# Patient Record
Sex: FEMALE | Race: BLACK OR AFRICAN AMERICAN | NOT HISPANIC OR LATINO | Employment: UNEMPLOYED | URBAN - METROPOLITAN AREA
[De-identification: names, ages, dates, MRNs, and addresses within clinical notes are randomized per-mention and may not be internally consistent; named-entity substitution may affect disease eponyms.]

---

## 2017-01-28 ENCOUNTER — COMMUNICATION - HEALTHEAST (OUTPATIENT)
Dept: SCHEDULING | Facility: CLINIC | Age: 27
End: 2017-01-28

## 2017-02-02 ENCOUNTER — HOSPITAL ENCOUNTER (OUTPATIENT)
Dept: PHYSICAL MEDICINE AND REHAB | Facility: CLINIC | Age: 27
Discharge: HOME | End: 2017-02-02
Attending: EMERGENCY MEDICINE

## 2017-02-02 DIAGNOSIS — M54.16 LUMBAR RADICULITIS: ICD-10-CM

## 2017-02-02 DIAGNOSIS — G47.9 SLEEP DISTURBANCE: ICD-10-CM

## 2017-02-02 DIAGNOSIS — M51.26 LUMBAR DISC HERNIATION: ICD-10-CM

## 2017-02-02 DIAGNOSIS — M79.18 MYOFASCIAL PAIN: ICD-10-CM

## 2017-02-02 ASSESSMENT — MIFFLIN-ST. JEOR: SCORE: 1503.8

## 2017-02-03 ENCOUNTER — COMMUNICATION - HEALTHEAST (OUTPATIENT)
Dept: PHYSICAL MEDICINE AND REHAB | Facility: CLINIC | Age: 27
End: 2017-02-03

## 2017-02-03 ENCOUNTER — HOSPITAL ENCOUNTER (OUTPATIENT)
Dept: MRI IMAGING | Facility: CLINIC | Age: 27
Discharge: HOME | End: 2017-02-03
Attending: PHYSICIAN ASSISTANT

## 2017-02-03 DIAGNOSIS — M51.26 LUMBAR DISC HERNIATION: ICD-10-CM

## 2017-02-03 DIAGNOSIS — M54.16 LUMBAR RADICULITIS: ICD-10-CM

## 2017-02-07 ENCOUNTER — COMMUNICATION - HEALTHEAST (OUTPATIENT)
Dept: PHYSICAL MEDICINE AND REHAB | Facility: CLINIC | Age: 27
End: 2017-02-07

## 2017-02-07 DIAGNOSIS — N83.209 CYST OF OVARY, UNSPECIFIED LATERALITY: ICD-10-CM

## 2017-03-06 ENCOUNTER — HOSPITAL ENCOUNTER (OUTPATIENT)
Dept: PHYSICAL MEDICINE AND REHAB | Facility: CLINIC | Age: 27
Discharge: HOME | End: 2017-03-06
Attending: PHYSICIAN ASSISTANT

## 2017-03-06 DIAGNOSIS — M54.2 CERVICALGIA: ICD-10-CM

## 2017-03-06 DIAGNOSIS — M54.16 LUMBAR RADICULITIS: ICD-10-CM

## 2017-03-06 DIAGNOSIS — M51.26 LUMBAR DISC HERNIATION: ICD-10-CM

## 2017-03-06 DIAGNOSIS — M79.18 MYOFASCIAL PAIN: ICD-10-CM

## 2017-06-01 ENCOUNTER — COMMUNICATION - HEALTHEAST (OUTPATIENT)
Dept: SCHEDULING | Facility: CLINIC | Age: 27
End: 2017-06-01

## 2017-06-17 ENCOUNTER — COMMUNICATION - HEALTHEAST (OUTPATIENT)
Dept: SCHEDULING | Facility: CLINIC | Age: 27
End: 2017-06-17

## 2018-03-28 ENCOUNTER — OFFICE VISIT (OUTPATIENT)
Dept: FAMILY MEDICINE | Facility: CLINIC | Age: 28
End: 2018-03-28
Payer: COMMERCIAL

## 2018-03-28 VITALS
WEIGHT: 200 LBS | TEMPERATURE: 98.1 F | HEIGHT: 61 IN | SYSTOLIC BLOOD PRESSURE: 107 MMHG | HEART RATE: 76 BPM | DIASTOLIC BLOOD PRESSURE: 74 MMHG | RESPIRATION RATE: 18 BRPM | OXYGEN SATURATION: 96 % | BODY MASS INDEX: 37.76 KG/M2

## 2018-03-28 DIAGNOSIS — N30.00 ACUTE CYSTITIS WITHOUT HEMATURIA: ICD-10-CM

## 2018-03-28 DIAGNOSIS — E04.9 ENLARGED THYROID: ICD-10-CM

## 2018-03-28 DIAGNOSIS — R05.3 CHRONIC COUGH: Primary | ICD-10-CM

## 2018-03-28 LAB
BACTERIA: NORMAL
BILIRUBIN UR: ABNORMAL
BLOOD UR: ABNORMAL
CASTS: NORMAL /LPF
CRYSTAL URINE: NORMAL /LPF
EPITHELIAL CELLS UR: NORMAL /LPF (ref 0–2)
GLUCOSE URINE: NEGATIVE
KETONES UR QL: NEGATIVE
LEUKOCYTE ESTERASE UR: ABNORMAL
MUCOUS URINE: NORMAL LPF
NITRITE UR QL STRIP: POSITIVE
PH UR STRIP: 8.5 [PH] (ref 5–7)
PROTEIN UR: ABNORMAL
RBC URINE: NORMAL /HPF
SP GR UR STRIP: 1.01
TSH SERPL DL<=0.05 MIU/L-ACNC: 0.56 UIU/ML (ref 0.3–5)
UROBILINOGEN UR STRIP-ACNC: ABNORMAL
WBC URINE: >100 /HPF

## 2018-03-28 RX ORDER — SULFAMETHOXAZOLE/TRIMETHOPRIM 800-160 MG
1 TABLET ORAL 2 TIMES DAILY
Qty: 10 TABLET | Refills: 0 | Status: SHIPPED | OUTPATIENT
Start: 2018-03-28 | End: 2018-04-02

## 2018-03-28 RX ORDER — GUAIFENESIN/DEXTROMETHORPHAN 100-10MG/5
5-10 SYRUP ORAL EVERY 4 HOURS PRN
Qty: 354 ML | Refills: 1 | Status: SHIPPED | OUTPATIENT
Start: 2018-03-28 | End: 2023-02-24

## 2018-03-28 NOTE — PATIENT INSTRUCTIONS
For cough:  1. Take robitussin 5-10 mL every 4 hours as needed  2. Someone will call you to schedule CT scan of your chest    For UTI:  1. Take bactrim twice daily for 5 days    Stop at the lab before you leave    Follow up in 1-2 weeks with Dr. Sorenson to go over results    CT CHEST:  No answer at phone number listed.  Nita Connelly  3/29/18    [facility]  [address] [place] [address]  [phone]  Date:   Monday April 9, 2018  Time:   3:00 PM  Nothing to eat or drink  3 hours prior to appointment.  Scheduled with patient via phone.  Nita Connelly  4/2/18

## 2018-03-28 NOTE — PROGRESS NOTES
"       SUBJECTIVE       Elisabeth Frankie Wyatt is a 27 year old  female with a PMH significant for:     Patient Active Problem List   Diagnosis     Abdominal pain     Pain      delivery delivered     She presents with cough and UTI.    Patient presents to establish care and for second opinion.    [facility]  She reports during that time she has had a chronic cough for 1 year.  She states that she has tried multiple things for this cough including codeine cough syrup, Robitussin cough syrup, antihistamines, omeprazole, Tessalon Perles.  None of these medications seem to help.  She states that her cough is so harsh and hard that she often throws up afterwards.  She states that it is been getting worse.  Her nighttime symptoms are worse.  She states that she cannot sleep.  She coughs up thick yellow mucus.  She states that she has been on antibiotics in the past which did not help.  She denies having fevers.  She has never had a CT scan of her chest or seen a pulmonologist or had pulmonary function testing.  However she has tried an albuterol inhaler without help.    Patient states that she was born in [facility], but was brought here when she was in the second grade.  Patient previously smoked, but quit approximately 4 months ago.  She states that she had moved into a new house around the time that her cough started, however has moved out and continues to cough.    Her last concern is that she has a urinary tract infection.  She states that she has burning with urination, urinary frequency, urinary hesitancy.  She denies having blood in the urine.  She denies fevers, back pain.    PMH, Medications and Allergies were reviewed and updated as needed.    ROS as above        OBJECTIVE     Vitals:    18 1553   BP: 107/74   Pulse: 76   Resp: 18   Temp: 98.1  F (36.7  C)   TempSrc: Oral   SpO2: 96%   Weight: 200 lb (90.7 kg)   Height: 5' 1\" (154.9 cm)     Body mass index is " 37.79 kg/(m^2).    General: Alert, appropriate, and cooperative. Frequently coughing, and distressed while coughing.  HEENT:  Atraumatic.  Pupils equal, round, and reactive bilaterally.  Extraocular movements intact. Bilateral TMs gray and translucent.  Mouth shows moist mucous membranes.  No tonsillar erythema or exudates.  Neck:  Supple.  No adenopathy.  Enlarged thyroid throughout--no nodule or goiter.  CV:  Regular rhythm and rate.  No murmurs, rubs, or gallops.  Lungs:  Clear to auscultation bilaterally.  No wheezes, rhonchi, or rales.  Abd:  Soft, non-distended, non-tender.  Bowel sounds present.    Ext:   No lower extremity edema.    Spirometry completed in clinic today.  Will be scanned into chart.    FVC 69% predicted  FEV1 71% predicted  FEV1/FVC ratio 105% predicted  No change with bronchodilator      ASSESSMENT AND PLAN     (R05) Chronic cough  (primary encounter diagnosis)  Comment: Patient has had chronic cough for 1 year that has not been responsive to typical over-the-counter and prescription cough medications, antihistamines, PPI.  She has quit smoking.  She has had multiple chest x-rays that were negative and these were reviewed in care everywhere.  Cough is extremely harsh.  Differential diagnosis is broad and includes cough variant asthma, infectious process including tuberculosis, restrictive process, reflux, allergies.  However given her lack of response to albuterol, antihistamines, PPI do feel that reflux, allergies, and asthma are unlikely.  She does have risk factors for tuberculosis since she is an immigrant from [facility].  Spirometry was checked in clinic today and showed a restrictive process (see results above)  Plan: Will check QuantiFERON gold in clinic today.  Treat with dextromethorphan-guaifenesin cough syrup.  Recommend CT scan of the chest.  Once we have the results results, we will likely refer to pulmonology.    (N30.00) Acute cystitis without hematuria  Comment: Complaining of  multiple days of urinary symptoms.  UA is clearly infected with positive nitrites and leuk esterase.  No evidence of ongoing pyelonephritis.  Plan: Will treat with 5 day course of bactrim.    (E04.9) Enlarged thyroid  Comment: Thyroid is enlarged on exam.  No nodules or goiter.  I do not feel that this is likely contributing to her cough, as her thyroid would be have to be very large to cause such problems with her breathing and cough, however will not rule it out.  Plan: Will check thyroid studies.  Will consider US of thyroid at next visit.      RTC in 1-2 weeks for follow up of cough or sooner if develops new or worsening symptoms.    Staffed with Dr. Ortega.    Batsheva Sorenson MD PGY-3  [facility]  3/28/2018

## 2018-03-29 NOTE — PROGRESS NOTES
"Preceptor attestation:  Blood pressure 107/74, pulse 76, temperature 98.1  F (36.7  C), temperature source Oral, resp. rate 18, height 5' 1\" (154.9 cm), weight 200 lb (90.7 kg), SpO2 96 %, not currently breastfeeding.  Patient seen and discussed with the resident.  Assessment and plan reviewed with resident and agreed upon.  Supervising physician: Junior Ortega  [facility]  Results for orders placed or performed in visit on 03/28/18   Urinalysis, Micro If ([facility])   Result Value Ref Range    Specific Gravity Urine 1.015 1.005 - 1.030    pH Urine 8.5 4.5 - 8.0    Leukocyte Esterase UR 3+ (A) NEGATIVE    Nitrite Urine Positive (A) NEGATIVE    Protein UR 1+ (A) NEGATIVE    Glucose Urine Negative NEGATIVE    Ketones Urine Negative NEGATIVE    Urobilinogen mg/dL 2.0 E.U./dL (A) 0.2 E.U./dL    Bilirubin UR 1+ (A) NEGATIVE    Blood UR Trace-intact (A) NEGATIVE   Urine Microscopic ([facility])   Result Value Ref Range    WBC Urine >100 <5 /hpf    RBC Urine None <5 /hpf    Epithelial Cells UR 10-25 0 - 2 /lpf    Mucous Urine None NONE lpf    Casts Urine None NONE /lpf    Crystal Urine None NONE /lpf    Bacteria Wet Prep Many None   Thyroid [place] ([facility])   Result Value Ref Range    TSH 0.56 0.30 - 5.00 uIU/mL    Narrative    Test performed by:  [facility]  [address] [place] [address]         "

## 2018-04-02 LAB
QTF INTERPRETATION: NORMAL
QTF MITOGEN - NIL: >10 IU/ML
QTF NIL: 0.05 IU/ML
QTF RESULT: NEGATIVE
QTF TB ANTIGEN - NIL: 0 IU/ML

## 2018-04-10 PROBLEM — J98.4 RESTRICTIVE LUNG DISEASE: Status: ACTIVE | Noted: 2018-04-10

## 2018-04-10 NOTE — PROGRESS NOTES
Called patient with results.  UA had been discussed and treated in clinic.  TSH normal and TB test negative.  Patient has not yet had CT scan of her chest due to transportation issues.  She will reschedule this and follow up in clinic after the CT scan.  She verbalized understanding.

## 2018-09-05 ENCOUNTER — HOSPITAL ENCOUNTER (OUTPATIENT)
Dept: PHYSICAL MEDICINE AND REHAB | Facility: CLINIC | Age: 28
Discharge: HOME | End: 2018-09-05
Attending: PHYSICAL MEDICINE & REHABILITATION

## 2018-09-05 ENCOUNTER — COMMUNICATION - HEALTHEAST (OUTPATIENT)
Dept: PHYSICAL MEDICINE AND REHAB | Facility: CLINIC | Age: 28
End: 2018-09-05

## 2018-09-05 DIAGNOSIS — M51.369 DDD (DEGENERATIVE DISC DISEASE), LUMBAR: ICD-10-CM

## 2018-09-05 DIAGNOSIS — M79.18 MYOFASCIAL PAIN: ICD-10-CM

## 2018-09-05 DIAGNOSIS — Z76.89 ENCOUNTER TO ESTABLISH CARE WITH NEW DOCTOR: ICD-10-CM

## 2018-09-05 DIAGNOSIS — M53.3 SACROILIAC JOINT PAIN: ICD-10-CM

## 2018-09-05 ASSESSMENT — MIFFLIN-ST. JEOR: SCORE: 1553.69

## 2019-07-18 ENCOUNTER — HOSPITAL ENCOUNTER (OUTPATIENT)
Dept: PHYSICAL MEDICINE AND REHAB | Facility: CLINIC | Age: 29
Discharge: HOME | End: 2019-07-18
Attending: PHYSICAL MEDICINE & REHABILITATION

## 2019-07-18 DIAGNOSIS — M79.18 MYOFASCIAL PAIN: ICD-10-CM

## 2019-07-18 DIAGNOSIS — M54.2 NECK PAIN: ICD-10-CM

## 2019-07-18 DIAGNOSIS — M89.8X1 PERISCAPULAR PAIN: ICD-10-CM

## 2019-07-18 DIAGNOSIS — M54.6 ACUTE RIGHT-SIDED THORACIC BACK PAIN: ICD-10-CM

## 2019-07-18 ASSESSMENT — MIFFLIN-ST. JEOR: SCORE: 1542.81

## 2019-07-25 ENCOUNTER — COMMUNICATION - HEALTHEAST (OUTPATIENT)
Dept: PHYSICAL MEDICINE AND REHAB | Facility: CLINIC | Age: 29
End: 2019-07-25

## 2019-08-19 ENCOUNTER — COMMUNICATION - HEALTHEAST (OUTPATIENT)
Dept: PHYSICAL MEDICINE AND REHAB | Facility: CLINIC | Age: 29
End: 2019-08-19

## 2019-08-19 DIAGNOSIS — M89.8X1 PERISCAPULAR PAIN: ICD-10-CM

## 2019-08-19 DIAGNOSIS — M54.2 NECK PAIN: ICD-10-CM

## 2020-01-01 NOTE — MR AVS SNAPSHOT
After Visit Summary   3/28/2018    Elisabeth Wyatt    MRN: [id number]           Patient Information     Date Of Birth          1990        Visit Information        Provider Department      3/28/2018 4:10 PM Batsheva Sorenson MD [facility]        Today's Diagnoses     Acute cystitis without hematuria    -  1    Chronic cough        Enlarged thyroid          Care Instructions    For cough:  1. Take robitussin 5-10 mL every 4 hours as needed  2. Someone will call you to schedule CT scan of your chest    For UTI:  1. Take bactrim twice daily for 5 days    Stop at the lab before you leave    Follow up in 1-2 weeks with Dr. Sorenson to go over results          Follow-ups after your visit        Future tests that were ordered for you today     Open Future Orders        Priority Expected Expires Ordered    CT CHEST W CONTRAST Routine  3/28/2019 3/28/2018            Who to contact     Please call your clinic at [phone] to:    Ask questions about your health    Make or cancel appointments    Discuss your medicines    Learn about your test results    Speak to your doctor            Additional Information About Your Visit        [employer] Information     StyleQ is an electronic gateway that provides easy, online access to your medical records. With StyleQ, you can request a clinic appointment, read your test results, renew a prescription or communicate with your care team.     To sign up for StyleQ visit the website at [email or web address]   You will be asked to enter the access code listed below, as well as some personal information. Please follow the directions to create your username and password.     Your access code is: [email or web address]  Expires: 2018  4:51 PM     Your access code will  in 90 days. If you need help or a new code, please contact your [facility] or call [phone] for assistance.        Care EveryWhere ID     This is  "your Care EveryWhere ID. This could be used by other organizations to access your [place] medical records  [id number]        Your Vitals Were     Pulse Temperature Respirations Height Pulse Oximetry BMI (Body Mass Index)    76 98.1  F (36.7  C) (Oral) 18 5' 1\" (154.9 cm) 96% 37.79 kg/m2       Blood Pressure from Last 3 Encounters:   03/28/18 107/74   07/13/16 103/49   06/03/16 114/48    Weight from Last 3 Encounters:   03/28/18 200 lb (90.7 kg)   03/06/16 137 lb (62.1 kg)   08/11/13 168 lb (76.2 kg)              We Performed the Following     Myc Tuberc Qtferon ([facility])     Thyroid [place] ([facility])     Urinalysis, Micro If ([facility])     Urine Microscopic ([facility] )          Today's Medication Changes          These changes are accurate as of 3/28/18  4:51 PM.  If you have any questions, ask your nurse or doctor.               Start taking these medicines.        Dose/Directions    guaiFENesin-dextromethorphan 100-10 MG/5ML syrup   Commonly known as:  ROBITUSSIN DM   Used for:  Chronic cough   Started by:  Batsheva Sorenson MD        Dose:  5-10 mL   Take 5-10 mLs by mouth every 4 hours as needed for cough   Quantity:  354 mL   Refills:  1       sulfamethoxazole-trimethoprim 800-160 MG per tablet   Commonly known as:  BACTRIM DS/SEPTRA DS   Used for:  Acute cystitis without hematuria   Started by:  Batsheva oSrenson MD        Dose:  1 tablet   Take 1 tablet by mouth 2 times daily for 5 days   Quantity:  10 tablet   Refills:  0            Where to get your medicines      These medications were sent to [facility] - [place], [place] - [address], [place] [address]     Phone:  [phone]     guaiFENesin-dextromethorphan 100-10 MG/5ML syrup    sulfamethoxazole-trimethoprim 800-160 MG per tablet                Primary Care Provider Fax #    Physician No Ref-Primary [phone]       No address on file        Equal Access to Services     TYLER CLINTON AH: Hadnicole " esme Corona, waaxda luqadaha, qaybta kaalmada nancy, waxtracee dorcas zavaletasonglion muller rob. So [facility] [phone].    ATENCIÓN: Si habla marisaañol, tiene a esquivel disposición servicios gratuitos de asistencia lingüística. Tc al [phone].    We comply with applicable federal civil rights laws and [place] laws. We do not discriminate on the basis of race, color, national origin, age, disability, sex, sexual orientation, or gender identity.            Thank you!     Thank you for choosing [facility]  for your care. Our goal is always to provide you with excellent care. Hearing back from our patients is one way we can continue to improve our services. Please take a few minutes to complete the written survey that you may receive in the mail after your visit with us. Thank you!             Your Updated Medication List - Protect others around you: Learn how to safely use, store and throw away your medicines at www.disposemymeds.org.          This list is accurate as of 3/28/18  4:51 PM.  Always use your most recent med list.                   Brand Name Dispense Instructions for use Diagnosis    acetaminophen-codeine 300-30 MG per tablet    TYLENOL WITH CODEINE #3    15 tablet    Take 1-2 tablets by mouth every 6 hours as needed for pain        albuterol 108 (90 BASE) MCG/ACT Inhaler    PROAIR HFA    1 Inhaler    Inhale 2 puffs into the lungs every 4 hours as needed for shortness of breath / dyspnea        guaiFENesin-codeine 100-10 MG/5ML Soln solution    ROBITUSSIN AC    120 mL    Take 10 mLs by mouth every 4 hours as needed for cough        guaiFENesin-dextromethorphan 100-10 MG/5ML syrup    ROBITUSSIN DM    354 mL    Take 5-10 mLs by mouth every 4 hours as needed for cough    Chronic cough       HYDROcodone-acetaminophen 5-325 MG per tablet    NORCO    15 tablet    Take 1-2 tablets by mouth every 4 hours as needed for moderate to severe pain        * IBUPROFEN PO      Take 600 mg by mouth as  needed for moderate pain        * ibuprofen 600 MG tablet    ADVIL/MOTRIN    20 tablet    Take 1 tablet (600 mg) by mouth every 8 hours as needed for moderate pain        predniSONE 20 MG tablet    DELTASONE    10 tablet    Take two tablets (= 40mg) each day for 5 (five) days        sulfamethoxazole-trimethoprim 800-160 MG per tablet    BACTRIM DS/SEPTRA DS    10 tablet    Take 1 tablet by mouth 2 times daily for 5 days    Acute cystitis without hematuria       TYLENOL PO      Take 650 mg by mouth as needed for mild pain or fever        * Notice:  This list has 2 medication(s) that are the same as other medications prescribed for you. Read the directions carefully, and ask your doctor or other care provider to review them with you.       Statement Selected

## 2020-02-12 ENCOUNTER — TRANSFERRED RECORDS (OUTPATIENT)
Dept: HEALTH INFORMATION MANAGEMENT | Facility: CLINIC | Age: 30
End: 2020-02-12

## 2021-05-30 VITALS — BODY MASS INDEX: 33.49 KG/M2 | WEIGHT: 182 LBS | HEIGHT: 62 IN

## 2021-05-30 VITALS — BODY MASS INDEX: 33.29 KG/M2 | WEIGHT: 182 LBS

## 2021-05-30 NOTE — TELEPHONE ENCOUNTER
1 week follow-up call placed to pt after last visit with Dr. Horton on 7/18/2019. Left message with pt's significant other. Pt will call back later.

## 2021-05-30 NOTE — PROGRESS NOTES
Assessment:   Elisabeth Wyatt is a 28 y.o. y.o. female with past medical history significant for seasonal allergies who presents today for follow-up regarding new symptoms of acute and severe bilateral periscapular pain.  The patient also has bilateral neck pain with pain going in the right thoracic area, more along the flank (acute thoracic right sided pain).  She is also having numbness at the webbing of the right thumb and index finger in addition to the right ankle.  This is of unknown significance at this time.  All of the symptoms have only been present for the last 2 to 3 days.  She is stating that the pain is very severe.  Currently her pain is thought to be from significant myofascial pain.  She is without any red flag symptoms and is without any upper motor neuron signs.       Plan:     A shared decision making plan was used.  The patient's values and choices were respected.  The following represents what was discussed and decided upon by the physician and the patient.      1.  DIAGNOSTIC TESTS:    -Given the acute onset and the severity of her symptoms, Dr. Horton wanted to order an ESR and a CRP to look for any inflammatory process.  -The patient was questioning about an MRI.  At this time will be difficult to know if she should have an MRI of the cervical spine or the thoracic spine.  Given that her pain has only been present for 2 days and that she is neurologically intact at this time without red flag symptoms, we will try conservative treatment first.  However her symptoms will be monitored closely and if they do not improve as expected, imaging of the cervical and/or thoracic spine can be ordered.  2.  PHYSICAL THERAPY: No physical therapy is ordered at this time.  If her symptoms improve but do not completely resolve, she may benefit from physical therapy.  3.  MEDICATIONS: The patient denies being pregnant at this time.  She is also not contemplating becoming pregnant.  She was told that if she  thinks that she is pregnant, she needs to stop these medications immediately as they can cause harm to the baby.  - She was interested in any medications that may provide her with some relief.  Dr. Horton recommended trialing a 7-day course of indomethacin.  Indomethacin 75 mg 1 tablet twice a day (with breakfast and with dinner) is prescribed today.  She should take indomethacin with food/water to prevent any stomach upset.  She should refrain from taking any over-the-counter NSAIDs while she takes indomethacin.  -After she completes the indomethacin, she can take nabumetone 500 mg up to every 8 hours as needed for pain.  She should refrain from taking any over-the-counter NSAIDs while she takes nabumetone.  She should take this medication with food/water to prevent any stomach upset.  -Cyclobenzaprine 5 mg tablets were prescribed today.  She can take 1 or 2 tablets up to every 8 hours as needed for pain.  Explained to the patient that the medication may cause drowsiness so she should not work or drive while she takes this medication.  4.  INTERVENTIONS: No interventions are necessary at this time.  5.  PATIENT EDUCATION:    -Reassured the patient that her symptoms should improve with the medications.  If they do not, reassured her that work-up would be performed.  She is encouraged to contact the clinic if any of her symptoms significantly worsen instead of improve with the medications or if she develops any significant new symptoms.  Patient verbalized understanding and expressed gratitude for the care received today.  6.  FOLLOW-UP: Nurse navigation is asked to call patient in 1 week.  If there are any questions/concerns or any significant worsening of pain prior to that time, the patient is asked to call the clinic via the nurse navigation line or via [employer].      Subjective:     Elisabeth Wyatt is a 28 y.o. female who presents today for follow-up regarding new symptoms of very severe pain near the shoulder  blade area.  She marks on her pain picture that the pain is in the neck and going across the tops of the shoulders.  But then she asked to point out the pain on the physician.  She grabs both of the physician shoulder blades to state where most of her pain is.  She reports that it is on both sides, equal between the 2 sides.  She describes it as a sharp or stabbing type sensation.  She then has pain in her neck.  She also has pain going along her torso.  All of the symptoms started about 2 to 3 days ago.  She denies any injury or any new activities.  She says she is just on her typical routine.  She does have some pain or numbness in the webbing of the right hand between the thumb and the index finger.  She also has some numbness in the right lateral ankle.  She has been taking ibuprofen 800 mg but this is not helping.  She reports that she has had pain in the past but nothing this severe.  The hand and the ankle symptoms are new.  She reports that she has been without pain for quite some time.  She tried doing some yoga but it actually aggravated the pain.  She is currently rating her pain today at an 8 or 9 out of 10.  At worst it is a 10 out of 10.  At best it is an 8 out of 10.  She reports that the pain is fairly constant at this time.  She is not finding any relief.  She states that she has pain that wakes her up at night.  However the pain during the night is at the same severity as the pain during the day (is not worse between night or day).  She did have a bad headache last night, that was located frontally.  However went away after about an hour, and she does not complain of other headaches besides that one episode.      Past medical history is reviewed and is unchanged for any new medical diagnoses in the interim.      Family history is reviewed and is unchanged in the interim.        Review of Systems:  Positive for headache as stated in the HPI.  Patient specifically denies any shortness of breath,  "chest pain, abdominal pain, bowel or bladder incontinence, hematuria, hematochezia, fevers or chills, night sweats, appetite changes, unexplained weight loss, difficulty swallowing, difficulty with hand skills.     Objective:   CONSTITUTIONAL:  Vital signs as above.  No acute distress.  The patient is well nourished and well groomed.    PSYCHIATRIC:  The patient is awake, alert, oriented to person, place and time.  The patient is answering questions appropriately with clear speech.  Normal affect.  SKIN:  Skin over the face, neck bilateral upper extremities is clean, dry, intact without rashes.  MUSCULOSKELETAL: The patient has 5/5 strength for the bilateral shoulder abductors, elbow flexors/extensors, wrist extensors, finger flexors/abductors.  The patient has normal range of motion of the cervical spine with cervical flexion extension.  She denies any significant pain with these motions.  Patient does report \"a little\" pain with bilateral cervical sidebending and bilateral cervical rotation.  Range of motion with cervical sidebending and cervical rotation is largely normal.  Patient does report positive Spurling's test bilaterally with pain localizing to the ipsilateral scapula with Spurling's maneuver.  She does have significant tenderness over the bilateral cervical paraspinal muscles, upper trapezius muscles, thoracic paraspinal muscles, periscapular area, right thoracic paraspinal muscles.  She begins crying during the palpation part of the exam.  NEUROLOGICAL:  2/4  Biceps, brachioradialis, triceps reflexes bilaterally.  Negative Chapman's bilaterally.  Sensation to pinprick is mildly impaired in the right thumb and index fingers compared to these digits on the left hand.  Otherwise sensation to pinprick is intact in all of the other digits of both hands.         "

## 2021-05-30 NOTE — PATIENT INSTRUCTIONS - HE
Indomethacin (a very strong prescription anti-inflammatory medication) has been prescribed today.  Please take 75 mg (1 tablet) with breakfast and 75 mg (1 tablet) with dinner.  You will take this for 7 days.  Please do not skip any doses.  It is very important to take this medication with food/water to prevent any stomach upset.  Please discontinue this medication if it does cause severe stomach upset and please call the clinic at [phone] to notify us if you choose to stop taking it.  Please do not take any other prescription NSAIDs while you take Indomethacin.  Also, please do not take any other the counter NSAIDs (such as Ibuprofen/Motrin/Advil/Aleve/Naproxen) while you are taking Indomethacin.      After you complete Indomethacine, please take Nabumetone.  Nabumetone (which is an anti-inflammatory) medication is prescribed today.  Take 1 tablet 3 times a day as needed for pain.  This medication should be taken with food and water to prevent any stomach upset.  Do not take ibuprofen/Advil/Motrin/Aleve/naproxen while you take Nabumetone.  Please call if you have any side effects to  Nabumetone.    Cyclobenzaprine 5 mg (muscle relaxant medication) has been prescribed today.  Please take 1-2 tablets up to every 8 hours as needed for pain.  This medication may cause drowsiness.  Please do not work or drive while taking this medication until you know how it effects you.  If it does make you drowsy, you should only take it before bedtime or at times that you do not have to work/drive.    A nurse will call you in 1 week to see how you are doing.  If you are doing better at that time, you are welcome to follow-up as needed.  If you are not doing better, the nurse will relay this information to the physician and then further recommendations can be made. Please do not hesitate to contact the clinic at [phone] if you have any questions/concerns or any worsening of your pain prior to that time.  You are also  welcome to contact Dr. Horton via [employer].

## 2021-05-31 NOTE — TELEPHONE ENCOUNTER
Insurance will cover IR but not ER capsules.    New order written for Indomethacin 25 mg IR capsules.  Sig:  Take 3 capsules twice a day with meals for 7 days.  #42 capsules with no refills given.  Navigation to call him and let him know these should be available to him at the pharmacy and to update his status to physician.

## 2021-05-31 NOTE — TELEPHONE ENCOUNTER
Prior Authorization Request  Who s requesting:  Pharmacy  Pharmacy Name and Location: [address] [address]  Medication Name: Indomethacin 75mg ER capsules   Insurance Plan:   1.[employer] [place]  2.[place]  Insurance Member ID Number:    1.[id number]  [id number]

## 2021-05-31 NOTE — TELEPHONE ENCOUNTER
Central PA team  [phone]  Pool: [facility]AR[facility] PA MED ([id number])          PA has been initiated.       PA form completed and faxed insurance via Cover My Meds     Key:  [id number]        Medication: Indomethacin ER 75MG er capsules      Insurance: [facility] Medicaid          Response will be received via fax and may take up to 5-10 business days depending on plan

## 2021-05-31 NOTE — TELEPHONE ENCOUNTER
Patient called back stating she wants to make an appointment as she is still having a lot of pain. Encouraged her to try the Indomethacin first. She has been taking the Flexeril and Relafen without relief. Explained she should stop taking the Relafen now that the Indomethacin has been covered. She should also avoid Advil, Motrin, Aleve, Ibuprofen, etc while taking the Indomethacin. She should call if she is not finding relief with the Indomethacin. Stated understanding and appreciation for the assistance.

## 2021-05-31 NOTE — TELEPHONE ENCOUNTER
"Phone call to pt to discuss. Information regarding denial shared. \"Yeah they never covered it. I am still in a lot of pain. I am at my mom's right now for help with my kids. Can I come back and see her?\" Explained that a new prescription of different strength was covered by insurance and has been sent in to her pharmacy. Instructed to try this for her pain. Directions reviewed with her. Stated understanding.     She needed to hang up and will call back about another appointment.  "

## 2021-06-01 VITALS — WEIGHT: 193 LBS | BODY MASS INDEX: 35.51 KG/M2 | HEIGHT: 62 IN

## 2021-06-03 VITALS — BODY MASS INDEX: 35.07 KG/M2 | WEIGHT: 190.6 LBS | HEIGHT: 62 IN
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
## 2021-06-09 NOTE — PROGRESS NOTES
Assessment:   Elisabeth Wyatt is a 26 y.o. y.o. female who is otherwise healthy who presents today for follow-up regarding her low back pain with radiation to the right lower extremity.  MRI of the lumbar spine showed a diffuse disc bulge with a right paracentral disc protrusion at L5-S1, which is likely the source of her pain.  The patient also complains of a 4 day history of right greater than left neck pain which is likely myofascial in nature.       Plan:     A shared decision making plan was used.  The patient's values and choices were respected.  The following represents what was discussed and decided upon by the physician assistant and the patient.      1.  DIAGNOSTIC TESTS:  I reviewed the MRI of the lumbar spine.  The patient's neck pain fails to improve, she may benefit from imaging of the cervical spine for further evaluation.  I encouraged the patient to schedule her pelvic ultrasound for the ovarian cyst which was incidentally found on her MRI lumbar spine.    2.  PHYSICAL THERAPY: I had referred the patient to physical therapy when I saw her in consultation on February 2, 2017.  The patient did not go to physical therapy.  I strongly recommended that she schedule a visit and follow through with the program with home exercises.    3.  MEDICATIONS:  I refilled the patient's cyclobenzaprine 10 mg 3 times daily as needed.  I recommended that the patient continue using ibuprofen 600 mg as needed.  She should continue the gabapentin 300 mg at bedtime.  I did not refill the patient's oxycodone.  I told patient that I prefer to treat her pain aggressively with non-opioid pain medications, physical therapy, and possibly interventional pain management.  She voiced understanding to this.    4.  INTERVENTIONS:  No interventions were ordered today.  If the patient's low back and right leg pain feels improved with conservative treatment, she could have a right L5-S1, S1-S2 transforaminal epidural steroid injection.   In regards to her neck pain, she may benefit from trigger point injections.    5.  PATIENT EDUCATION:  The patient requested a note specifying her work restrictions.  I wrote a work ability note indicating that the patient could perform light-duty work with the following restrictions, no lifting more than 20 pounds, avoiding repetitive bending and twisting at the waist, and alternating sit/stand every 30 minutes.  Her work ability will be reevaluated in 4 weeks.    6.  FOLLOW-UP: I will see the patient back in the clinic in 4 weeks for follow-up.  If she has any question or concerns in the meantime, she should not hesitate to contact our clinic.    Subjective:     Elisabeth Wyatt is a 26 y.o. female who presents today for follow-up regarding low back pain with radiation into the right lower extremity.  I saw the patient in consultation on February 2, 2017.  At that time I ordered an MRI of the lumbar spine for further evaluation.  She returns today to review those results.  I also referred the patient to physical therapy.  The patient did not go to physical therapy.  She states that after her consult with me, her pain actually improved a couple of weeks that she did not think that physical therapy is necessary.  Unfortunately, about 4 days ago her pain returned.  She denies any injury or event that caused the recurrence of her pain.    The patient continues to complain of right-sided low back pain.  The pain radiates into the right buttock and down the right posterior lateral thigh to the knee.  She has numbness in the same distribution as her pain which also affects the anterior thigh.  She rates her pain today as an 8 or 10.  At its best it is a 6 out of 10.  At its worst it is a 10 out of 10.  The patient's pain is aggravated with bending, lifting, prolonged sitting, prolonged standing, and prolonged laying.  Her pain is alleviated temporarily with repositioning.  The patient states that her right leg feels  generally weaker than her left.    The patient also has a new complaint today.  The patient states that for the past 4 days she has had right greater left posterior neck pain.  She denies any pain radiating down the arms.  She denies any prior history of neck pain.  She has chronic numbness and tingling in her fingertips which is unchanged.  She denies any new numbness or tingling in her upper extremities.  She denies any upper extremity weakness.  She has had some headaches associated with her neck pain.    As mentioned above, the patient did not go to the physical therapy that I had prescribed for her.  She is currently using ibuprofen 600 mg as needed for pain.  She is also using gabapentin 300 mg at bedtime.  She ran out of her cyclobenzaprine and her Percocet.    Past medical history is reviewed and is unchanged in the interim.    Family history is reviewed and is unchanged in the interim.    Review of Systems:  Positive for numbness/tingling, weakness, headache, dizziness.  Negative loss of bowel/bladder control, foot drop, nausea/vomiting, blurry vision, balance changes.     Objective:   CONSTITUTIONAL:  Vital signs as above.  No acute distress.  The patient is well nourished and well groomed.    PSYCHIATRIC:  The patient is awake, alert, oriented to person, place and time.  The patient is answering questions appropriately with clear speech.  Normal affect.  HEENT: Normocephalic, atraumatic.  Sclera clear.    SKIN:  Skin over the face, posterior torso, bilateral upper and lower extremities is clean, dry, intact without rashes.  MUSCULOSKELETAL:  Gait is non-antalgic.  Mild tenderness over the right lower lumbar paraspinal muscles.  Tenderness to palpation and hypertonicity over the right greater than left upper trapezius muscle.    The patient has 5/5 strength for the bilateral shoulder abductors, elbow flexors/extensors, wrist flexors/extensors, graft, hip flexors, knee flexors/extensors, ankle  dorsiflexors/plantar flexors, ankle evertors/invertors.    NEUROLOGICAL:  2+ patellar, achilles reflexes which are symmetric bilaterally.  No ankle clonus bilaterally.  Sensation to light touch is intact over bilateral upper extremities throughout and in the bilateral L4, L5, and S1 dermatomes.       RESULTS:  MRI of the lumbar spine from [facility] February 3, 2017 was reviewed.  This shows mild multilevel degenerative disc and facet disease.  There is a shallow right paracentral disc protrusion at L5-S1 which approaches but does not definitely impinge upon the right S1 nerve root.  There is no central canal stenosis.  There is no neural foraminal stenosis.  Please see report for further details.

## 2021-06-16 NOTE — TELEPHONE ENCOUNTER
Telephone Encounter by Elisa Acharya at 8/21/2019  3:24 PM     Author: Elisa Acharya Service: -- Author Type: --    Filed: 8/21/2019  3:24 PM Encounter Date: 8/19/2019 Status: Signed    : Elisa Acharya       PRIOR AUTHORIZATION DENIED    Denial Rational: Needs to try/fail immediate release Indomethacin capsules        Appeal Information: This medication was denied. If physician would like to appeal because patient has contraindication or allergy to covered medication please write letter of medical necessity and route back to PA team to initiate.  If no further action is needed please close encounter thank you.
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
## 2023-02-24 ENCOUNTER — OFFICE VISIT (OUTPATIENT)
Dept: BEHAVIORAL HEALTH | Facility: CLINIC | Age: 33
End: 2023-02-24

## 2023-02-24 ENCOUNTER — TELEPHONE (OUTPATIENT)
Dept: BEHAVIORAL HEALTH | Facility: CLINIC | Age: 33
End: 2023-02-24

## 2023-02-24 VITALS — DIASTOLIC BLOOD PRESSURE: 77 MMHG | SYSTOLIC BLOOD PRESSURE: 118 MMHG | OXYGEN SATURATION: 99 % | HEART RATE: 113 BPM

## 2023-02-24 DIAGNOSIS — F11.90 OPIOID USE DISORDER: Primary | ICD-10-CM

## 2023-02-24 DIAGNOSIS — F11.93 OPIOID WITHDRAWAL (H): ICD-10-CM

## 2023-02-24 DIAGNOSIS — R06.2 WHEEZING: ICD-10-CM

## 2023-02-24 LAB
FENTANYL UR QL: ABNORMAL
HCG UR QL: NEGATIVE

## 2023-02-24 PROCEDURE — 99204 OFFICE O/P NEW MOD 45 MIN: CPT | Performed by: FAMILY MEDICINE

## 2023-02-24 PROCEDURE — 80307 DRUG TEST PRSMV CHEM ANLYZR: CPT | Performed by: FAMILY MEDICINE

## 2023-02-24 PROCEDURE — 81025 URINE PREGNANCY TEST: CPT | Performed by: FAMILY MEDICINE

## 2023-02-24 RX ORDER — BUPRENORPHINE AND NALOXONE 8; 2 MG/1; MG/1
1 FILM, SOLUBLE BUCCAL; SUBLINGUAL DAILY
Qty: 14 FILM | Refills: 0 | Status: SHIPPED | OUTPATIENT
Start: 2023-02-24

## 2023-02-24 RX ORDER — CLONIDINE HYDROCHLORIDE 0.1 MG/1
0.1 TABLET ORAL 3 TIMES DAILY PRN
Qty: 12 TABLET | Refills: 0 | Status: SHIPPED | OUTPATIENT
Start: 2023-02-24

## 2023-02-24 RX ORDER — ALBUTEROL SULFATE 90 UG/1
2 AEROSOL, METERED RESPIRATORY (INHALATION) EVERY 4 HOURS PRN
Qty: 18 G | Refills: 0 | Status: SHIPPED | OUTPATIENT
Start: 2023-02-24

## 2023-02-24 RX ORDER — TRAZODONE HYDROCHLORIDE 50 MG/1
50 TABLET, FILM COATED ORAL
Qty: 10 TABLET | Refills: 0 | Status: SHIPPED | OUTPATIENT
Start: 2023-02-24

## 2023-02-24 RX ORDER — HYDROXYZINE PAMOATE 50 MG/1
50 CAPSULE ORAL 3 TIMES DAILY PRN
Qty: 30 CAPSULE | Refills: 0 | Status: SHIPPED | OUTPATIENT
Start: 2023-02-24

## 2023-02-24 RX ORDER — ONDANSETRON 4 MG/1
4 TABLET, ORALLY DISINTEGRATING ORAL EVERY 8 HOURS PRN
Qty: 12 TABLET | Refills: 0 | Status: SHIPPED | OUTPATIENT
Start: 2023-02-24

## 2023-02-24 ASSESSMENT — PATIENT HEALTH QUESTIONNAIRE - PHQ9: SUM OF ALL RESPONSES TO PHQ QUESTIONS 1-9: 16

## 2023-02-24 ASSESSMENT — COLUMBIA-SUICIDE SEVERITY RATING SCALE - C-SSRS
3. HAVE YOU BEEN THINKING ABOUT HOW YOU MIGHT KILL YOURSELF?: NO
2. HAVE YOU ACTUALLY HAD ANY THOUGHTS OF KILLING YOURSELF IN THE PAST MONTH?: NO
4. HAVE YOU HAD THESE THOUGHTS AND HAD SOME INTENTION OF ACTING ON THEM?: NO
1. IN THE PAST MONTH, HAVE YOU WISHED YOU WERE DEAD OR WISHED YOU COULD GO TO SLEEP AND NOT WAKE UP?: NO
1. IN THE PAST MONTH, HAVE YOU WISHED YOU WERE DEAD OR WISHED YOU COULD GO TO SLEEP AND NOT WAKE UP?: NO
2. HAVE YOU ACTUALLY HAD ANY THOUGHTS OF KILLING YOURSELF LIFETIME?: NO
5. HAVE YOU STARTED TO WORK OUT OR WORKED OUT THE DETAILS OF HOW TO KILL YOURSELF? DO YOU INTEND TO CARRY OUT THIS PLAN?: NO
6. HAVE YOU EVER DONE ANYTHING, STARTED TO DO ANYTHING, OR PREPARED TO DO ANYTHING TO END YOUR LIFE?: NO

## 2023-02-24 ASSESSMENT — LIFESTYLE VARIABLES: TOTAL_SCORE: 9

## 2023-02-24 NOTE — PATIENT INSTRUCTIONS
We were able to start Suboxone in the clinic today.  You took at total of 2 films.  Tomorrow, take 1 film in the morning.  You can increase dosing to 1 film twice daily if needed.      Continue to take your comfort medications as needed.      You had some wheezing on exam.  I refilled your albuterol inhaler - it is available at the [facility].  If your symptoms are worsening please go to urgent care or the ER.    Follow-up in clinic next week.

## 2023-02-24 NOTE — NURSING NOTE
Provider ordered medications to be delivered to the clinic. Medications arrived at 1125 am.    Patient took 1 hydroxyzine and 1 clonidine and took one half of an 8 mg film (4 mg) but she swallowed it. She then took the rest of the film (4 mg) and kept this under her tongue after education.     She requested to go and have a cigarette and would not wait. She returned afterward. RN was out side of the door with medications securely with RN.    Recheck in 30 minutes (11:55 am)    BP: 117/77  P: 104  Sats: 99%    COWS: 9. Patient reports feeling better.     Per verbal instructions from provider, Patient took a second 8 mg film SL. RN educated patient not to take more suboxone today and use comfort medications which were reviewed with patient. Starting tomorrow, patient will take 8 mg BID. Patient conveyed good understanding and left the clinic to return to [facility] with accompanying peer.     Vee Valdez RN on 2/24/2023 at 12:11 PM

## 2023-02-24 NOTE — PROGRESS NOTES
[facility] Initial Visit    ASSESSMENT/PLAN                                                      1. Opioid use disorder  Based on history, she meets criteria for at least moderate OUD.  Would like to resume Suboxone.  Given it has been 2 days since last use and her COWS is 16 on presentation, we were able to start with in office induction.  She took clonidine and hydroxyzine and a total of 16mg of buprenorphine (though she swallowed first 4mg dose).  Advised her to take next dose tomorrow morning (8-2mg) and can increase to twice daily dosing if needed.  Narcan provided.  She will continue programming at [facility].    - HCG Qual, Urine ([id number]); Standing  - Fentanyl Urine, Qualitative; Future  - buprenorphine HCl-naloxone HCl (SUBOXONE) 8-2 MG per film; Place 1 Film under the tongue daily Increase to 1 film twice daily if needed for ongoing cravings or withdrawal symptoms.  Dispense: 14 Film; Refill: 0  - naloxone (NARCAN) 4 MG/0.1ML nasal spray; Spray 1 spray (4 mg) into one nostril alternating nostrils as needed for opioid reversal every 2-3 minutes until assistance arrives  Dispense: 0.2 mL; Refill: 11  - HCG Qual, Urine ([id number])  - Fentanyl Urine, Qualitative    2. Opioid withdrawal (H)  Comfort medications as ordered.   - cloNIDine (CATAPRES) 0.1 MG tablet; Take 1 tablet (0.1 mg) by mouth 3 times daily as needed (opioid withdrawal)  Dispense: 12 tablet; Refill: 0  - hydrOXYzine (VISTARIL) 50 MG capsule; Take 1 capsule (50 mg) by mouth 3 times daily as needed for anxiety  Dispense: 30 capsule; Refill: 0  - ondansetron (ZOFRAN ODT) 4 MG ODT tab; Take 1 tablet (4 mg) by mouth every 8 hours as needed for nausea or vomiting  Dispense: 12 tablet; Refill: 0  - traZODone (DESYREL) 50 MG tablet; Take 1 tablet (50 mg) by mouth nightly as needed for sleep  Dispense: 10 tablet; Refill: 0    3. Wheezing  Noted to have diffuse expiratory wheezing on exam, reports started yesterday.  Has needed  albuterol in past for history of asthma.  I have ordered an albuterol inhaler for her to  in the pharmacy and advised her to seek care if symptoms are worsening.    - albuterol (PROAIR HFA/PROVENTIL HFA/VENTOLIN HFA) 108 (90 Base) MCG/ACT inhaler; Inhale 2 puffs into the lungs every 4 hours as needed for shortness of breath, wheezing or cough  Dispense: 18 g; Refill: 0       Return in about 1 week (around 3/3/2023).    Patient counseling completed today:  Discussed mechanism of action, potential risks/benefits/side effects of medications and other recommendations above.    Discussed risk of precipitated withdrawal with initiation of buprenorphine in the presence of full opioid agonists.    Reviewed directions for initiation of buprenorphine to reduce risk of precipitated withdrawal and maximize efficacy.    Harm reduction counseling including never use alone, availability of naloxone, avoiding combination of opioids with benzodiazepines, alcohol, or other sedatives, safer administration.      Discussed importance of avoiding isolation, building a network of supportive relationships, avoiding people/places/things associated with past use to reduce risk of relapse; including motivational interviewing regarding psychosocial treatment for addiction.     SUBJECTIVE                                                      CC/HPI:  Elisabeth Wyatt is a 32 year old female with PMHx of asthma and opioid use disorder who presents to the [facility] for initial visit.      Brief History:  Elisabeth Wyatt was first seen in Recovery Clinic on 02/24/23. They were referred by   [facility]. Patient's reasons for seeking treatment on this date include treat opioid withdrawal with Suboxone.  Reports she has been on Suboxone in the past.       Patient entered treatment 2/22/2023 at 7:00 pm    Substance Use History :  Opioids:   Age at first use: 29   Current use: substance: Perc 30's; quantity 15; route: inhalation ;  timing of last use: 2/22/2023 at 1700;      IV drug use: No   History of overdose: Yes: 2  Previous residential or outpatient treatments for addiction : Yes: [facility] and [facility]  Previous medication treatments for addiction: Yes: Suboxone from [facility].  Longest period of sobriety: none  Medical complications related to substance use: None  Hepatitis C: negative (non-reactive (6/30/22)  HIV: negative/non-reactive (6/30/22)    Taking buprenorphine? No   Narcan currently available: Yes    DSM-5 OUD criteria met:  Taken in larger amounts/greater time spent in behavior over longer period of time than intended,Yes    Continued use/behavior despite having persistent or recurrent social or interpersonal problems caused or exacerbated by effects of use/behavior, Yes:    Tolerance, Yes:     Withdrawal, Yes:      Other Addiction History:  Stimulants (cocaine, methamphetamine, MDMA/ecstasy)   Tried methamphetamines  Sedatives/hypnotics/anxiolytics: (benzodiazepines, GHB, Ambien, phenobarbital)  Xanax - years ago  Alcohol:   None  Nicotine: (cigarettes, vaping, chew/snuff)  Smoke 1/2 pack per day  Cannabis:   none  Hallucinogens/Dissociatives: (acid, mushrooms, ketamine)  None  Eating disorder:  None  Gambling:   None        [place] Prescription Drug Monitoring Program Reviewed:  Yes; as expected        Past Medical History:   Diagnosis Date     Asthma          PAST PSYCHIATRIC HISTORY:  Diagnoses- Anxiety, PTSD, depression  Suicide Attempts: No   Hospitalizations: No     PHQ 2/24/2023   PHQ-9 Total Score 16   Q9: Thoughts of better off dead/self-harm past 2 weeks More than half the days         If PHQ-9 score of 15 or higher, has [facility] Clinic therapist or provider been notified? Yes    Any current suicidal ideation? No  If yes, has [facility] therapist or provider been notified? N/A    Mental health provider: None (follow up on MH referral if needed)    Past Surgical History:   Procedure Laterality Date       SECTION        SECTION        SECTION  2013    Procedure:  SECTION;  Repeat  section and lysis of adhesions.    Fetal heart tones 140bpm at 0910.    ;  Surgeon: Jordan Ellsworth MD;  Location: [facility] OR     DILATION AND EVACUATION  9/3/2011    Procedure:DILATION AND EVACUATION; Dilation and Evacuation; Surgeon:CARMEN COULTER; Location:[facility] OR       Medications:  albuterol (ALBUTEROL) 108 (90 BASE) MCG/ACT inhaler, Inhale 2 puffs into the lungs every 4 hours as needed for shortness of breath / dyspnea    No current facility-administered medications on file prior to visit.      No Known Allergies    Family History   Problem Relation Age of Onset     Diabetes No family hx of      Coronary Artery Disease No family hx of      No Known Problems Mother      No Known Problems Father      Hypertension Maternal Grandmother          Social History  Housing status: [facility]  Employment status: Unemployed, not seeking work  Relationship status: Single  Children: 3 children  Legal: None  Insurance needs: Active  Contact information up to date? Yes    3rd Party Involvement: [facility] (please obtain BISHOP if pt would like to include)    REVIEW OF SYSTEMS:  General: Withdrawal symptoms as described below.  No recent fevers.   Resp: No coughing, wheezing or shortness of breath (later when feeling better she endorsed some wheezing for past day or two, has history of asthma and does not currently have an albuterol inhaler)  CV: No chest pains or palpitations  GI: No complaints other than as above  : No urinary frequency or dysuria  Musculoskeletal: No significant muscle or joint pains other than as above.  No edema  Neurologic: No numbness, tingling, weakness  Psychiatric: No acute concerns other than as above.   Skin: No rashes or areas of acute infection    OBJECTIVE                                                        Clinical Opioid Withdrawal Scale (COWS)    Resting  Pulse Rate  2  =  101-120   Sweating    (over past 1/2 hour) 1  =  subjective report of chills or flushing   Restlessness  3  =  frequent shifting or extraneous movements of legs/arms   Pupil size  1  =  pupils possibly larger than normal for room light   Bone or Joint Aches    (acute only) 2  =  patient reports severe diffuse aching of joints/muscles   Runny nose or tearing    (unrelated to cold/allergies) 2  =  nose running or tearing   GI Upset    (over past 1/2 hour) 2  =  nausea or loose stool   Tremor    (outstretched hands) 1  =  tremor can be felt, but not observed   Yawning    (during assessment) 0  =  no yawning   Anxiety/Irritability 2  =  patient obviously irritable or anxious   Gooseflesh skin 0  =  skin is smooth     TOTAL SCORE  Add column for score   16       /77   Pulse 113   LMP  (Approximate)   SpO2 99%     Physical Exam  Vitals and nursing note reviewed.   Constitutional:       General: She is in acute distress.      Appearance: She is ill-appearing and diaphoretic.   HENT:      Head: Normocephalic and atraumatic.      Nose: Rhinorrhea present.   Cardiovascular:      Rate and Rhythm: Tachycardia present.   Pulmonary:      Effort: Pulmonary effort is normal. No respiratory distress.      Breath sounds: Wheezing (diffuse expiratory wheezing in all lung fields ) present. No rhonchi or rales.   Skin:     General: Skin is warm.   Neurological:      General: No focal deficit present.      Mental Status: She is alert and oriented to person, place, and time.      Gait: Gait normal.   Psychiatric:         Attention and Perception: Attention normal.         Mood and Affect: Mood is anxious and depressed. Affect is flat.         Behavior: Behavior is cooperative.      Comments: Judgment/insight fair  Initially giving very limited answers but more talkative after getting Suboxone and withdrawal symptoms improving         Labs:    UDS: Urine Drug Screen Results  AMP: Positive  BAR: Negative  BUP:  Negative  BZO: Negative  MAEGAN: Negative (faint ine)  mAMP: Positive  MDMA : Negative  MTD: Negative  ACP950: Negative  OXY: Negative  PCP : Negative  THC : Negative  *POC urine drug screen does not screen for Fentanyl    Recent Results (from the past 720 hour(s))   HCG Qual, Urine ([id number])    Collection Time: 02/24/23 10:44 AM   Result Value Ref Range    hCG Urine Qualitative Negative Negative   Fentanyl Urine, Qualitative    Collection Time: 02/24/23 10:44 AM   Result Value Ref Range    Fentanyl Qual Urine Screen Positive (A) Screen Negative         DO GERMAINE [place] [id number] [facility], [address]  [place], [place] [id number] [phone]